# Patient Record
(demographics unavailable — no encounter records)

---

## 2025-01-02 NOTE — HISTORY OF PRESENT ILLNESS
[FreeTextEntry1] : Dear Clemencia,   I had the pleasure of seeing your patient MATT GUERRERO for Cardiometabolic evaluation.   As you know, he  is a Pleasant, 64 year old with a past medical history of Retired Tech Executive - Hyperlipidemia, ASCVD S/p MI/PCI multiple interventions, Statin Intolerance to Atorva/Rosuva for > 8 wks tried twice =============== =============== Hyperlipidemia, ASCVD S/p MI/PCI multiple interventions, Statin Intolerance to Atorva/Rosuva for > 8 wks tried twice -  ASCVD not controlled on max dose statin - Continue Repatha - Check Lpa  - Discussed family screening  - Follows with Dr. Khanna ------------------ ----------------- 1-2025      ----------------------------

## 2025-01-02 NOTE — DISCUSSION/SUMMARY
[FreeTextEntry1] : In summary   Nicolas, 64 year old with a past medical history of Hyperlipidemia, ASCVD S/p MI/PCI multiple interventions, Statin Intolerance to Atorva/Rosuva for > 8 wks tried twice =============== =============== Hyperlipidemia, ASCVD S/p MI/PCI multiple interventions, Statin Intolerance to Atorva/Rosuva for > 8 wks tried twice -  ASCVD not controlled on max dose statin - Cont Repatha - Check Lpa  - Discussed family screening       Clemencia, kind thanks for the referral.   Patrick Garsia MD [facility] FRANSICO MCGINNIS Director, Preventive Cardiology & Lipidology [facility] [place]

## 2025-06-04 NOTE — PHYSICAL EXAM
[General Appearance - Well Developed] : well developed [Normal Appearance] : normal appearance [Well Groomed] : well groomed [General Appearance - Well Nourished] : well nourished [No Deformities] : no deformities [General Appearance - In No Acute Distress] : no acute distress [Normal Conjunctiva] : the conjunctiva exhibited no abnormalities [Eyelids - No Xanthelasma] : the eyelids demonstrated no xanthelasmas [Normal Oral Mucosa] : normal oral mucosa [No Oral Pallor] : no oral pallor [No Oral Cyanosis] : no oral cyanosis [Normal Jugular Venous A Waves Present] : normal jugular venous A waves present [Normal Jugular Venous V Waves Present] : normal jugular venous V waves present [No Jugular Venous Doyle A Waves] : no jugular venous doyle A waves [Abdomen Soft] : soft [Abdomen Tenderness] : non-tender [Abdomen Mass (___ Cm)] : no abdominal mass palpated [Abnormal Walk] : normal gait [Gait - Sufficient For Exercise Testing] : the gait was sufficient for exercise testing [Nail Clubbing] : no clubbing of the fingernails [Cyanosis, Localized] : no localized cyanosis [Petechial Hemorrhages (___cm)] : no petechial hemorrhages [Skin Color & Pigmentation] : normal skin color and pigmentation [] : no rash [No Venous Stasis] : no venous stasis [Skin Lesions] : no skin lesions [No Skin Ulcers] : no skin ulcer [No Xanthoma] : no  xanthoma was observed [Oriented To Time, Place, And Person] : oriented to person, place, and time [Affect] : the affect was normal [Mood] : the mood was normal [No Anxiety] : not feeling anxious [Normal Rhythm/Effort] : normal respiratory rhythm and effort [Clear Bilaterally] : the lungs were clear to auscultation bilaterally [Normal to Percussion] : the lungs were normal to percussion [5th Left ICS - MCL] : palpated at the 5th LICS in the midclavicular line [Normal] : normal [Normal Rate] : normal [Rhythm Regular] : regular [Normal S1] : normal S1 [Normal S2] : normal S2 [No Murmur] : no murmurs heard [2+] : left 2+ [No Abnormalities] : the abdominal aorta was not enlarged and no bruit was heard [No Pitting Edema] : no pitting edema present [S3] : no S3 [S4] : no S4 [Right Carotid Bruit] : no bruit heard over the right carotid [Left Carotid Bruit] : no bruit heard over the left carotid [Right Femoral Bruit] : no bruit heard over the right femoral artery [Left Femoral Bruit] : no bruit heard over the left femoral artery

## 2025-06-04 NOTE — DISCUSSION/SUMMARY
[EKG obtained to assist in diagnosis and management of assessed problem(s)] : EKG obtained to assist in diagnosis and management of assessed problem(s) [FreeTextEntry1] : 1)CAD s/p MI: post LCx and OM1 PCI with LARRY, s/p mLAD and mLCx PCI 4/2018, and RCA 8/2024.  EKG and echo stable.. -cont ASA and Brilinta 2)HTN: BP at goal -cont metoprolol and lisinopril -cont diet and exercise -HRs 50s so cannot adjust bb 3)HL: lipids at goal but with progressive CAD -trial of pravchol again ... tolerating well -cont Repatha

## 2025-06-04 NOTE — HISTORY OF PRESENT ILLNESS
[FreeTextEntry1] : Naomy 56 yo man with a PMH of CAD s/p acute MI 10/2014 s/p PCI with LARRY to LCx and OM1, and s/p mLAD and mLCX PCI 4/2018. Here for f/u. Doing very well.  Compliant with medications.  Exercising daily; bikes frequently without symptoms.  Follows a good diet.  Denied chest pain/dyspnea/palpitations/syncope/CAROLYN/PND/orthopnea.  6/7/19: s/p mLAD and mLCx PCI 4/2018.  Compliant with meds.  11/20/20: feeling well; no changes in meds.  Continues to exercise.  Changed diet to pescatarian. SBPs elevated to 140s.  3/25/22: Feeling well; denied any cardiac sx; compliant with meds SBPs 140s at home +Myalgias with statins in the morning   3/16/23 (chart note): pt s/p cardiac cath 4/2022: patent stents with no significant new lesions Awaiting oral surgery to remove ulcer  5/5/23: here for follow-up feeling well; denied chest pain/palpitations/dyspnea/syncope Compliant with meds Exercises without issue EKG: sinus stella 52bpm cardiac cath 4/2021: patent stents with no significant new lesions Lesion noted on tongue... awaiting partial glossectomy   5/24/24: c/o myalgias w/ statin... will try pravachol diffuse body aches including chest discomfort  9/2024: s/p cath with PCI to RCA feeling well now concerned about progressive CAD  6/4/25: s/p cath with PCI to RCA 8/2024 feeling well now concerned about progressive CAD